# Patient Record
Sex: MALE | Race: BLACK OR AFRICAN AMERICAN | Employment: FULL TIME | URBAN - METROPOLITAN AREA
[De-identification: names, ages, dates, MRNs, and addresses within clinical notes are randomized per-mention and may not be internally consistent; named-entity substitution may affect disease eponyms.]

---

## 2022-12-05 ENCOUNTER — HOSPITAL ENCOUNTER (EMERGENCY)
Age: 37
Discharge: HOME | End: 2022-12-05

## 2022-12-05 VITALS
SYSTOLIC BLOOD PRESSURE: 146 MMHG | DIASTOLIC BLOOD PRESSURE: 91 MMHG | BODY MASS INDEX: 32.93 KG/M2 | HEART RATE: 56 BPM | WEIGHT: 230 LBS | RESPIRATION RATE: 14 BRPM | HEIGHT: 70 IN | TEMPERATURE: 98.8 F | OXYGEN SATURATION: 99 %

## 2022-12-05 DIAGNOSIS — R51.9 RECURRENT HEADACHE: ICD-10-CM

## 2022-12-05 DIAGNOSIS — R51.9 NONINTRACTABLE HEADACHE, UNSPECIFIED CHRONICITY PATTERN, UNSPECIFIED HEADACHE TYPE: Primary | ICD-10-CM

## 2022-12-05 DIAGNOSIS — R03.0 ELEVATED BP WITHOUT DIAGNOSIS OF HYPERTENSION: ICD-10-CM

## 2022-12-05 LAB
ANION GAP SERPL CALCULATED.3IONS-SCNC: 9 MMOL/L (ref 3–16)
BACTERIA: NORMAL /HPF
BASOPHILS ABSOLUTE: 0 K/UL (ref 0–0.2)
BASOPHILS RELATIVE PERCENT: 0.4 %
BILIRUBIN URINE: NEGATIVE
BLOOD, URINE: NEGATIVE
BUN BLDV-MCNC: 10 MG/DL (ref 7–20)
CALCIUM SERPL-MCNC: 9.6 MG/DL (ref 8.3–10.6)
CHLORIDE BLD-SCNC: 103 MMOL/L (ref 99–110)
CLARITY: CLEAR
CO2: 28 MMOL/L (ref 21–32)
COLOR: YELLOW
CREAT SERPL-MCNC: 0.9 MG/DL (ref 0.9–1.3)
EOSINOPHILS ABSOLUTE: 0.1 K/UL (ref 0–0.6)
EOSINOPHILS RELATIVE PERCENT: 2.3 %
EPITHELIAL CELLS, UA: 0 /HPF (ref 0–5)
GFR SERPL CREATININE-BSD FRML MDRD: >60 ML/MIN/{1.73_M2}
GLUCOSE BLD-MCNC: 101 MG/DL (ref 70–99)
GLUCOSE URINE: NEGATIVE MG/DL
HCT VFR BLD CALC: 43.7 % (ref 40.5–52.5)
HEMOGLOBIN: 14.7 G/DL (ref 13.5–17.5)
HYALINE CASTS: 0 /LPF (ref 0–8)
KETONES, URINE: NEGATIVE MG/DL
LEUKOCYTE ESTERASE, URINE: ABNORMAL
LYMPHOCYTES ABSOLUTE: 2.3 K/UL (ref 1–5.1)
LYMPHOCYTES RELATIVE PERCENT: 45.3 %
MCH RBC QN AUTO: 31.3 PG (ref 26–34)
MCHC RBC AUTO-ENTMCNC: 33.7 G/DL (ref 31–36)
MCV RBC AUTO: 92.8 FL (ref 80–100)
MICROSCOPIC EXAMINATION: YES
MONOCYTES ABSOLUTE: 0.3 K/UL (ref 0–1.3)
MONOCYTES RELATIVE PERCENT: 6.4 %
NEUTROPHILS ABSOLUTE: 2.3 K/UL (ref 1.7–7.7)
NEUTROPHILS RELATIVE PERCENT: 45.6 %
NITRITE, URINE: NEGATIVE
PDW BLD-RTO: 12.7 % (ref 12.4–15.4)
PH UA: 6 (ref 5–8)
PLATELET # BLD: 240 K/UL (ref 135–450)
PMV BLD AUTO: 8.1 FL (ref 5–10.5)
POTASSIUM SERPL-SCNC: 4.9 MMOL/L (ref 3.5–5.1)
PROTEIN UA: NEGATIVE MG/DL
RBC # BLD: 4.71 M/UL (ref 4.2–5.9)
RBC UA: 0 /HPF (ref 0–4)
SODIUM BLD-SCNC: 140 MMOL/L (ref 136–145)
SPECIFIC GRAVITY UA: 1.01 (ref 1–1.03)
URINE REFLEX TO CULTURE: ABNORMAL
URINE TYPE: ABNORMAL
UROBILINOGEN, URINE: 0.2 E.U./DL
WBC # BLD: 5.1 K/UL (ref 4–11)
WBC UA: 1 /HPF (ref 0–5)

## 2022-12-05 PROCEDURE — 80048 BASIC METABOLIC PNL TOTAL CA: CPT

## 2022-12-05 PROCEDURE — 99283 EMERGENCY DEPT VISIT LOW MDM: CPT

## 2022-12-05 PROCEDURE — 85025 COMPLETE CBC W/AUTO DIFF WBC: CPT

## 2022-12-05 PROCEDURE — 81001 URINALYSIS AUTO W/SCOPE: CPT

## 2022-12-05 PROCEDURE — 6370000000 HC RX 637 (ALT 250 FOR IP): Performed by: GENERAL ACUTE CARE HOSPITAL

## 2022-12-05 RX ORDER — BUTALBITAL, ACETAMINOPHEN AND CAFFEINE 50; 325; 40 MG/1; MG/1; MG/1
2 TABLET ORAL ONCE
Status: COMPLETED | OUTPATIENT
Start: 2022-12-05 | End: 2022-12-05

## 2022-12-05 RX ADMIN — BUTALBITAL, ACETAMINOPHEN, AND CAFFEINE 2 TABLET: 50; 325; 40 TABLET ORAL at 11:59

## 2022-12-05 ASSESSMENT — PAIN SCALES - GENERAL
PAINLEVEL_OUTOF10: 4
PAINLEVEL_OUTOF10: 8

## 2022-12-05 ASSESSMENT — PAIN DESCRIPTION - LOCATION: LOCATION: HEAD

## 2022-12-05 ASSESSMENT — PAIN - FUNCTIONAL ASSESSMENT: PAIN_FUNCTIONAL_ASSESSMENT: 0-10

## 2022-12-05 NOTE — DISCHARGE INSTRUCTIONS
Use your fluid intake and be sure to eat at regular intervals. It is important that you follow-up with your primary care physician within the next 48 hours for reevaluation. You may take OTC Tylenol or Excedrin to help with your headaches. Return for high fever, incessant vomiting, severe pain, any other worsening symptoms.

## 2022-12-05 NOTE — Clinical Note
Pelon Garcia was seen and treated in our emergency department on 12/5/2022. He may return to work on 12/07/2022. If you have any questions or concerns, please don't hesitate to call.       Yamini Donnelly, APRN - CNP

## 2022-12-05 NOTE — ED PROVIDER NOTES
[facility]        Pt Name: Gila Rodas  MRN: [id number]  [place] 1985  Date of evaluation: 12/5/2022  Provider: JOY Zacarias CNP  PCP: JOY Thornton CNP  Note Started: 1:39 PM EST 12/5/22          IAR. I have evaluated this patient. My supervising physician was available for consultation. CHIEF COMPLAINT       Chief Complaint   Patient presents with    Headache     Has had headache for a few days with dizziness. Has had blood pressure checked at home with elevated reading. HISTORY OF PRESENT ILLNESS   (Location, Timing/Onset, Context/Setting, Quality, Duration, Modifying Factors, Severity, Associated Signs and Symptoms)  Note limiting factors. Chief Complaint: Headache, elevated blood pressure    Cabrera Moreno is a 40 y.o. male who presents to the emergency department today reporting headache. Patient does report history of frequent headaches. Patient states that he has never seen a neurologist or formally been diagnosed with migraines but states that he tends to get headaches 3-4 times a month. Patient states that today's headache was gradual in onset. There has been no falls or trauma. Patient states that he has had intermittent lightheadedness as well. Patient states that his wife checked his blood pressure at home and noticed that it was elevated. There is no history of diagnosed hypertension. Patient denies having any visual disturbances. He does wear glasses. He currently reports a pain level of 7 out of 10. He describes pain as constant, dull, and aching. The pain is nonmigratory. He denies sensitivity to light or sound. Patient denies neck pain or stiffness. He denies chest pain or trouble breathing. He denies having any abdominal pain. Patient has not taken anything for his symptoms citing \"I do not like taking medications\".   He denies fever, chills, or other symptoms. Nursing Notes were all reviewed and agreed with or any disagreements were addressed in the HPI. REVIEW OF SYSTEMS    (2-9 systems for level 4, 10 or more for level 5)     Review of Systems   Constitutional:  Negative for chills, fatigue and fever. HENT:  Negative for congestion, sore throat and voice change. Eyes:  Negative for visual disturbance. Respiratory:  Negative for cough, chest tightness, shortness of breath and wheezing. Cardiovascular:  Negative for chest pain and palpitations. Gastrointestinal:  Negative for abdominal pain, nausea and vomiting. Endocrine: Negative for polydipsia and polyuria. Genitourinary:  Negative for difficulty urinating, dysuria and flank pain. Musculoskeletal:  Negative for back pain, neck pain and neck stiffness. Skin:  Negative for rash and wound. Allergic/Immunologic: Negative for immunocompromised state. Neurological:  Positive for dizziness and headaches. Negative for weakness and light-headedness. Hematological:  Does not bruise/bleed easily. Psychiatric/Behavioral:  Negative for sleep disturbance and suicidal ideas. Positives and Pertinent negatives as per HPI. Except as noted above in the ROS, all other systems were reviewed and negative. PAST MEDICAL HISTORY   History reviewed. No pertinent past medical history. SURGICAL HISTORY   History reviewed. No pertinent surgical history. CURRENTMEDICATIONS       There are no discharge medications for this patient. ALLERGIES     Patient has no known allergies. FAMILYHISTORY     History reviewed. No pertinent family history.        SOCIAL HISTORY       Social History     Tobacco Use    Smoking status: Never    Smokeless tobacco: Never   Substance Use Topics    Alcohol use: Yes     Comment: socially    Drug use: Never       SCREENINGS    [place] Coma Scale  Eye Opening: Spontaneous  Best Verbal Response: Oriented  Best Motor Response: Obeys commands  David Coma Scale Score: 15        PHYSICAL EXAM    (up to 7 for level 4, 8 or more for level 5)     ED Triage Vitals [12/05/22 1113]   BP Temp Temp Source Heart Rate Resp SpO2 Height Weight   (!) 146/91 98.8 °F (37.1 °C) Oral 56 14 99 % 5' 10\" (1.778 m) 230 lb (104.3 kg)       Physical Exam  Vitals and nursing note reviewed. Constitutional:       General: He is not in acute distress. Appearance: Normal appearance. He is not ill-appearing. HENT:      Head: Normocephalic and atraumatic. Right Ear: Tympanic membrane, ear canal and external ear normal.      Left Ear: Tympanic membrane, ear canal and external ear normal.      Nose: Nose normal.      Mouth/Throat:      Mouth: Mucous membranes are moist.      Pharynx: Oropharynx is clear. Eyes:      General:         Right eye: No discharge. Left eye: No discharge. Extraocular Movements: Extraocular movements intact. Conjunctiva/sclera: Conjunctivae normal.      Pupils: Pupils are equal, round, and reactive to light. Cardiovascular:      Rate and Rhythm: Normal rate and regular rhythm. Pulses: Normal pulses. Heart sounds: Normal heart sounds. Pulmonary:      Effort: Pulmonary effort is normal. No respiratory distress. Breath sounds: Normal breath sounds. Abdominal:      General: Bowel sounds are normal.      Palpations: Abdomen is soft. Tenderness: There is no abdominal tenderness. There is no right CVA tenderness or left CVA tenderness. Musculoskeletal:         General: Normal range of motion. Cervical back: Normal range of motion and neck supple. No rigidity or tenderness. Right lower leg: No edema. Left lower leg: No edema. Skin:     General: Skin is warm and dry. Capillary Refill: Capillary refill takes less than 2 seconds. Neurological:      General: No focal deficit present. Mental Status: He is alert and oriented to person, place, and time. Mental status is at baseline.    Psychiatric: Mood and Affect: Mood normal.         Behavior: Behavior normal.         Thought Content: Thought content normal.         Judgment: Judgment normal.       DIAGNOSTIC RESULTS   LABS:    Labs Reviewed   BASIC METABOLIC PANEL - Abnormal; Notable for the following components:       Result Value    Glucose 101 (*)     All other components within normal limits   URINALYSIS WITH REFLEX TO CULTURE - Abnormal; Notable for the following components:    Leukocyte Esterase, Urine TRACE (*)     All other components within normal limits   CBC WITH AUTO DIFFERENTIAL   MICROSCOPIC URINALYSIS       When ordered only abnormal lab results are displayed. All other labs were within normal range or not returned as of this dictation. EKG: When ordered, EKG's are interpreted by the Emergency Department Physician in the absence of a cardiologist.  Please see their note for interpretation of EKG. RADIOLOGY:   Non-plain film images such as CT, Ultrasound and MRI are read by the radiologist. Plain radiographic images are visualized and preliminarily interpreted by the ED Provider with the below findings:        Interpretation per the Radiologist below, if available at the time of this note:    No orders to display     No results found. PROCEDURES   Unless otherwise noted below, none     Procedures    CRITICAL CARE TIME   none    CONSULTS:  None      EMERGENCY DEPARTMENT COURSE and DIFFERENTIAL DIAGNOSIS/MDM:   Vitals:    Vitals:    12/05/22 1113   BP: (!) 146/91   Pulse: 56   Resp: 14   Temp: 98.8 °F (37.1 °C)   TempSrc: Oral   SpO2: 99%   Weight: 230 lb (104.3 kg)   Height: 5' 10\" (1.778 m)       Patient was given the following medications:  Medications   butalbital-acetaminophen-caffeine (FIORICET, ESGIC) per tablet 2 tablet (2 tablets Oral Given 12/5/22 1159)         Is this patient to be included in the SEP-1 Core Measure due to severe sepsis or septic shock?    No   Exclusion criteria - the patient is NOT to be included for SEP-1 Core Measure due to: Infection is not suspected    Previous records reviewed in order to gain further information regarding patient's PMH is well as his HPI. Nursing notes reviewed. This a 76-year-old otherwise healthy male who presents to the emergency department today reporting a headache, lightheadedness, and elevated blood pressure. Patient does report history of frequent headaches although he has not been formally diagnosed with migraines. Patient states that he does not take anything for the symptoms because he does not like taking medications. Patient states that his wife took his blood pressure at home and noticed that it was elevated. Patient is counseled on the importance of good blood pressure control. Patient is medicated with Fioricet here in the ED. He is also given 1 L p.o. fluids to drink. Baseline laboratories studies are unremarkable. There is no evidence of any endorgan damage. Patient reassessed. He has remained stable yes mildly hypertensive throughout ED visit. He does report 100% relief of symptoms after intervention. At this time there is no evidence of any life-threatening or emergent conditions requiring immediate intervention. Low suspicion for an intracranial hemorrhage, intracranial mass, ACS, MI, CVA, or TIA. Patient will be discharged with emphasis on close outpatient follow-up. Patient is encouraged to increase his water intake and to follow a low-sodium diet. He is advised to follow-up with his primary care physician within the next 2 to 3 days. He agrees return for high fever, incessant vomiting, severe pain, new or worsening symptoms. Patient is discharged home in stable condition. FINAL IMPRESSION      1. Nonintractable headache, unspecified chronicity pattern, unspecified headache type    2. Elevated BP without diagnosis of hypertension    3.  Recurrent headache          DISPOSITION/PLAN   DISPOSITION Decision To Discharge 12/05/2022 01:44:05 PM      PATIENT REFERRED TO:  JOY Andres CNP  [address] [address]  [phone]    In 2 days      DISCHARGE MEDICATIONS:  There are no discharge medications for this patient. DISCONTINUED MEDICATIONS:  There are no discharge medications for this patient.              (Please note that portions of this note were completed with a voice recognition program.  Efforts were made to edit the dictations but occasionally words are mis-transcribed.)    JOY Churchill CNP (electronically signed)            JOY Churchill CNP  12/08/22 [id number]

## 2022-12-08 ASSESSMENT — ENCOUNTER SYMPTOMS
VOMITING: 0
NAUSEA: 0
COUGH: 0
SORE THROAT: 0
SHORTNESS OF BREATH: 0
VOICE CHANGE: 0
ABDOMINAL PAIN: 0
BACK PAIN: 0
CHEST TIGHTNESS: 0
WHEEZING: 0